# Patient Record
Sex: FEMALE | Race: WHITE | NOT HISPANIC OR LATINO | Employment: UNEMPLOYED | URBAN - METROPOLITAN AREA
[De-identification: names, ages, dates, MRNs, and addresses within clinical notes are randomized per-mention and may not be internally consistent; named-entity substitution may affect disease eponyms.]

---

## 2024-01-16 ENCOUNTER — OFFICE VISIT (OUTPATIENT)
Dept: PULMONOLOGY | Facility: HOSPITAL | Age: 59
End: 2024-01-16
Payer: MEDICAID

## 2024-01-16 VITALS
HEART RATE: 74 BPM | OXYGEN SATURATION: 88 % | DIASTOLIC BLOOD PRESSURE: 72 MMHG | RESPIRATION RATE: 14 BRPM | BODY MASS INDEX: 28.07 KG/M2 | WEIGHT: 143 LBS | HEIGHT: 60 IN | SYSTOLIC BLOOD PRESSURE: 139 MMHG

## 2024-01-16 DIAGNOSIS — G47.33 OSA (OBSTRUCTIVE SLEEP APNEA): ICD-10-CM

## 2024-01-16 DIAGNOSIS — J44.9 CHRONIC OBSTRUCTIVE PULMONARY DISEASE, UNSPECIFIED COPD TYPE: Primary | ICD-10-CM

## 2024-01-16 PROCEDURE — G0463 HOSPITAL OUTPT CLINIC VISIT: HCPCS

## 2024-01-16 RX ORDER — ALBUTEROL SULFATE 90 UG/1
2 AEROSOL, METERED RESPIRATORY (INHALATION)
COMMUNITY
Start: 2023-12-27

## 2024-01-16 RX ORDER — FLUTICASONE PROPIONATE AND SALMETEROL 50; 250 UG/1; UG/1
2 POWDER RESPIRATORY (INHALATION)
COMMUNITY
Start: 2023-12-12

## 2024-01-16 RX ORDER — GABAPENTIN 300 MG/1
1 CAPSULE ORAL 3 TIMES DAILY
COMMUNITY
Start: 2023-12-27

## 2024-01-16 NOTE — PROGRESS NOTES
PULMONARY/ CRITICAL CARE/ SLEEP MEDICINE OUTPATIENT CONSULT/ FOLLOW UP NOTE        Patient Name:  Kathy Schmidt    :  1965    Medical Record:  [id number]    PRIMARY CARE PHYSICIAN     Deana Salgado APRN    REASON FOR CONSULTATION    Kathy Schmidt is a 58 y.o. female who is referred for consultation for COPD  REVIEW OF SYSTEMS    Constitutional:  Denies fever or chills   Eyes:  Denies change in visual acuity   HENT:  Denies nasal congestion or sore throat   Respiratory:  Denies cough or shortness of breath   Cardiovascular:  Denies chest pain or edema   GI:  Denies abdominal pain, nausea, vomiting, bloody stools or diarrhea   :  Denies dysuria   Musculoskeletal:  Denies back pain or joint pain   Integument:  Denies rash   Neurologic:  Denies headache, focal weakness or sensory changes   Endocrine:  Denies polyuria or polydipsia   Lymphatic:  Denies swollen glands   Psychiatric:  Denies depression or anxiety     MEDICAL HISTORY    No past medical history on file.     SURGICAL HISTORY    No past surgical history on file.     FAMILY HISTORY    No family history on file.    SOCIAL HISTORY    Social History     Tobacco Use    Smoking status: Not on file    Smokeless tobacco: Not on file   Substance Use Topics    Alcohol use: Not on file        ALLERGIES    Not on File      MEDICATIONS    No current outpatient medications on file prior to visit.     No current facility-administered medications on file prior to visit.       PHYSICAL EXAM    There were no vitals filed for this visit.     Constitutional:  Well developed, well nourished, no acute distress, non-toxic appearance   Eyes:  PERRL, conjunctiva normal   HENT:  Atraumatic, external ears normal, nose normal, oropharynx moist, no pharyngeal exudates. mallampatti   Neck- normal range of motion, no tenderness, supple   Respiratory:  No respiratory distress, normal breath sounds, no rales, no wheezing   Cardiovascular:  Normal rate, normal rhythm, no murmurs,  no gallops, no rubs   GI:  Soft, nondistended, normal bowel sounds, nontender, no organomegaly, no mass, no rebound, no guarding   :  No costovertebral angle tenderness   Musculoskeletal:  No edema, no tenderness, no deformities. Back- no tenderness  Integument:  Well hydrated, no rash   Lymphatic:  No lymphadenopathy noted   Neurologic:  Alert & oriented x 3, CN 2-12 normal, normal motor function, normal sensory function, no focal deficits noted   Psychiatric:  Speech and behavior appropriate     No radiology results for the last 90 days.       ASSESSMENT & PLAN:      58-year-old female quit smoking July 2023, history of 40-pack-year smoking  Complaining of excessive daytime sleepiness fatigue difficulty concentrating during daytime loud snoring and witnessed apneas  No PFTs  No CT scans  On exam diminished breath sounds bilaterally.    Assessment     possible COPD  Possible obstructive sleep apnea    Plan  Low-dose CT chest no contrast lung cancer screening  PFTs  Home sleep study  Continue Advair and albuterol    This document has been electronically signed by  Tamiko Milan MD  09:34 EST

## 2024-01-23 ENCOUNTER — HOSPITAL ENCOUNTER (OUTPATIENT)
Dept: SLEEP MEDICINE | Facility: HOSPITAL | Age: 59
Discharge: HOME | End: 2024-01-23
Admitting: INTERNAL MEDICINE
Payer: MEDICAID

## 2024-01-23 DIAGNOSIS — G47.33 OSA (OBSTRUCTIVE SLEEP APNEA): ICD-10-CM

## 2024-01-23 PROCEDURE — 95806 SLEEP STUDY UNATT&RESP EFFT: CPT

## 2024-02-04 DIAGNOSIS — G47.33 OSA (OBSTRUCTIVE SLEEP APNEA): Primary | ICD-10-CM

## 2024-02-21 ENCOUNTER — HOSPITAL ENCOUNTER (OUTPATIENT)
Dept: RESPIRATORY THERAPY | Facility: HOSPITAL | Age: 59
Discharge: HOME | End: 2024-02-21
Payer: MEDICAID

## 2024-02-21 ENCOUNTER — APPOINTMENT (OUTPATIENT)
Dept: CT IMAGING | Facility: HOSPITAL | Age: 59
End: 2024-02-21
Payer: MEDICAID

## 2024-02-21 VITALS — HEART RATE: 79 BPM | OXYGEN SATURATION: 94 % | RESPIRATION RATE: 17 BRPM

## 2024-02-21 DIAGNOSIS — J44.9 CHRONIC OBSTRUCTIVE PULMONARY DISEASE, UNSPECIFIED COPD TYPE: ICD-10-CM

## 2024-02-21 PROCEDURE — 94726 PLETHYSMOGRAPHY LUNG VOLUMES: CPT

## 2024-02-21 PROCEDURE — 94060 EVALUATION OF WHEEZING: CPT

## 2024-02-21 PROCEDURE — 94664 DEMO&/EVAL PT USE INHALER: CPT

## 2024-02-21 PROCEDURE — 94799 UNLISTED PULMONARY SVC/PX: CPT

## 2024-02-21 PROCEDURE — 94729 DIFFUSING CAPACITY: CPT

## 2024-02-21 RX ORDER — ALBUTEROL SULFATE 90 UG/1
2 AEROSOL, METERED RESPIRATORY (INHALATION) ONCE
Status: COMPLETED | OUTPATIENT
Start: 2024-02-21 | End: 2024-02-21

## 2024-02-21 RX ADMIN — ALBUTEROL SULFATE 2 PUFF: 108 AEROSOL, METERED RESPIRATORY (INHALATION) at 09:09

## 2024-04-11 ENCOUNTER — OFFICE VISIT (OUTPATIENT)
Dept: PULMONOLOGY | Facility: HOSPITAL | Age: 59
End: 2024-04-11
Payer: MEDICAID

## 2024-04-11 VITALS
DIASTOLIC BLOOD PRESSURE: 73 MMHG | OXYGEN SATURATION: 90 % | HEIGHT: 60 IN | RESPIRATION RATE: 14 BRPM | BODY MASS INDEX: 28.27 KG/M2 | HEART RATE: 79 BPM | WEIGHT: 144 LBS | SYSTOLIC BLOOD PRESSURE: 128 MMHG

## 2024-04-11 DIAGNOSIS — J43.9 PULMONARY EMPHYSEMA, UNSPECIFIED EMPHYSEMA TYPE: Primary | ICD-10-CM

## 2024-04-11 PROCEDURE — G0463 HOSPITAL OUTPT CLINIC VISIT: HCPCS

## 2024-04-11 NOTE — PROGRESS NOTES
PULMONARY/ CRITICAL CARE/ SLEEP MEDICINE OUTPATIENT CONSULT/ FOLLOW UP NOTE        Patient Name:  Kathy Schmidt    :  1965    Medical Record:  [id number]    PRIMARY CARE PHYSICIAN     Deana Salgado APRN    REASON FOR CONSULTATION    Kathy Schmidt is a 58 y.o. female who is referred for consultation for MARGARET  REVIEW OF SYSTEMS    Constitutional:  Denies fever or chills   Eyes:  Denies change in visual acuity   HENT:  Denies nasal congestion or sore throat   Respiratory:  Denies cough or shortness of breath   Cardiovascular:  Denies chest pain or edema   GI:  Denies abdominal pain, nausea, vomiting, bloody stools or diarrhea   :  Denies dysuria   Musculoskeletal:  Denies back pain or joint pain   Integument:  Denies rash   Neurologic:  Denies headache, focal weakness or sensory changes   Endocrine:  Denies polyuria or polydipsia   Lymphatic:  Denies swollen glands   Psychiatric:  Denies depression or anxiety     MEDICAL HISTORY    Past Medical History:   Diagnosis Date    COPD (chronic obstructive pulmonary disease)     Dermatophytosis of nail     Encounter for long-term (current) use of other medications     Lumbar radiculopathy     Sleep apnea, obstructive 2024    CPAP Therapy        SURGICAL HISTORY    Past Surgical History:   Procedure Laterality Date    HYSTERECTOMY          FAMILY HISTORY    Family History   Problem Relation Age of Onset    Thyroid disease Mother     Emphysema Father     Heart disease Father        SOCIAL HISTORY    Social History     Tobacco Use    Smoking status: Former     Current packs/day: 0.00     Types: Cigarettes     Quit date:      Years since quittin.2     Passive exposure: Current    Smokeless tobacco: Never   Substance Use Topics    Alcohol use: Never        ALLERGIES    No Known Allergies      MEDICATIONS    Current Outpatient Medications on File Prior to Visit   Medication Sig Dispense Refill    Advair Diskus 250-50 MCG/ACT DISKUS Inhale 2 puffs 2 (Two)  "Times a Day.      albuterol sulfate  (90 Base) MCG/ACT inhaler Inhale 2 puffs 4 (Four) Times a Day.      gabapentin (NEURONTIN) 300 MG capsule Take 1 capsule by mouth 3 times a day.      METHADONE HCL PO Place 85 mg on the tongue Every Morning.       No current facility-administered medications on file prior to visit.       PHYSICAL EXAM    Vitals:    04/11/24 0914   BP: 128/73   BP Location: Left arm   Patient Position: Sitting   Cuff Size: Adult   Pulse: 79   Resp: 14   SpO2: 90%   Weight: 65.3 kg (144 lb)   Height: 152.4 cm (60\")        Constitutional:  Well developed, well nourished, no acute distress, non-toxic appearance   Eyes:  PERRL, conjunctiva normal   HENT:  Atraumatic, external ears normal, nose normal, oropharynx moist, no pharyngeal exudates. mallampatti   Neck- normal range of motion, no tenderness, supple   Respiratory:  No respiratory distress, normal breath sounds, no rales, no wheezing   Cardiovascular:  Normal rate, normal rhythm, no murmurs, no gallops, no rubs   GI:  Soft, nondistended, normal bowel sounds, nontender, no organomegaly, no mass, no rebound, no guarding   :  No costovertebral angle tenderness   Musculoskeletal:  No edema, no tenderness, no deformities. Back- no tenderness  Integument:  Well hydrated, no rash   Lymphatic:  No lymphadenopathy noted   Neurologic:  Alert & oriented x 3, CN 2-12 normal, normal motor function, normal sensory function, no focal deficits noted   Psychiatric:  Speech and behavior appropriate     No radiology results for the last 90 days.       ASSESSMENT & PLAN:          58-year-old female quit smoking July 2023, history of 40-pack-year smoking  Complaining of excessive daytime sleepiness fatigue difficulty concentrating during daytime loud snoring and witnessed apneas    Pulmonary function test 2/21/2024.  FVC 1.3 L which is 46%, improved to 57% postbronchodilator which is 22% change  FEV1 0.94 L which is 41%, improved to 51% postbronchodilator " which is 23% change  FEV1/FVC ratio 69.  Residual volume 197%  Total lung capacity 117%  Diffusion capacity 80%.      On exam diminished breath sounds bilaterally.     Assessment      severe COPD  severe obstructive sleep apnea baseline AHI 39     Plan    Low-dose CT chest no contrast lung cancer screening    Continue Advair and albuterol    Smoking cessation  Continue auto CPAP 8-20 with EPR 3       This document has been electronically signed by  Tamiko Milan MD  09:25 EDT

## 2024-07-25 ENCOUNTER — OFFICE VISIT (OUTPATIENT)
Dept: PULMONOLOGY | Facility: HOSPITAL | Age: 59
End: 2024-07-25
Payer: MEDICAID

## 2024-07-25 ENCOUNTER — PHARMACY IMMUNIZATION REVIEW (OUTPATIENT)
Dept: PHARMACY | Facility: HOSPITAL | Age: 59
End: 2024-07-25
Payer: MEDICAID

## 2024-07-25 VITALS
HEIGHT: 60 IN | BODY MASS INDEX: 25.52 KG/M2 | WEIGHT: 130 LBS | DIASTOLIC BLOOD PRESSURE: 85 MMHG | RESPIRATION RATE: 16 BRPM | OXYGEN SATURATION: 88 % | SYSTOLIC BLOOD PRESSURE: 174 MMHG | HEART RATE: 94 BPM

## 2024-07-25 DIAGNOSIS — J44.9 CHRONIC OBSTRUCTIVE PULMONARY DISEASE, UNSPECIFIED COPD TYPE: Primary | ICD-10-CM

## 2024-07-25 PROCEDURE — G0463 HOSPITAL OUTPT CLINIC VISIT: HCPCS

## 2024-07-25 RX ORDER — FLUTICASONE PROPIONATE AND SALMETEROL 50; 250 UG/1; UG/1
2 POWDER RESPIRATORY (INHALATION)
Qty: 3 EACH | Refills: 11 | Status: SHIPPED | OUTPATIENT
Start: 2024-07-25 | End: 2024-08-24

## 2024-07-25 NOTE — PROGRESS NOTES
I have reviewed the notes, assessments, and/or procedures performed by Monique Hayes, pharmacy technician, I concur with her documentation of Kathy Schmidt.

## 2024-07-25 NOTE — PROGRESS NOTES
PULMONARY/ CRITICAL CARE/ SLEEP MEDICINE OUTPATIENT CONSULT/ FOLLOW UP NOTE        Patient Name:  Kathy Schmidt    :  1965    Medical Record:  [id number]    PRIMARY CARE PHYSICIAN     eDana Salgado APRN    REASON FOR CONSULTATION    Kathy Schmidt is a 59 y.o. female who is referred for consultation for COPD  REVIEW OF SYSTEMS    Constitutional:  Denies fever or chills   Eyes:  Denies change in visual acuity   HENT:  Denies nasal congestion or sore throat   Respiratory:  Denies cough or shortness of breath   Cardiovascular:  Denies chest pain or edema   GI:  Denies abdominal pain, nausea, vomiting, bloody stools or diarrhea   :  Denies dysuria   Musculoskeletal:  Denies back pain or joint pain   Integument:  Denies rash   Neurologic:  Denies headache, focal weakness or sensory changes   Endocrine:  Denies polyuria or polydipsia   Lymphatic:  Denies swollen glands   Psychiatric:  Denies depression or anxiety     MEDICAL HISTORY    Past Medical History:   Diagnosis Date    COPD (chronic obstructive pulmonary disease)     Dermatophytosis of nail     Encounter for long-term (current) use of other medications     Lumbar radiculopathy     Sleep apnea, obstructive 2024    CPAP Therapy        SURGICAL HISTORY    Past Surgical History:   Procedure Laterality Date    HYSTERECTOMY          FAMILY HISTORY    Family History   Problem Relation Age of Onset    Thyroid disease Mother     Emphysema Father     Heart disease Father        SOCIAL HISTORY    Social History     Tobacco Use    Smoking status: Former     Current packs/day: 0.00     Types: Cigarettes     Quit date:      Years since quittin.5     Passive exposure: Current    Smokeless tobacco: Never   Substance Use Topics    Alcohol use: Never        ALLERGIES    No Known Allergies      MEDICATIONS    Current Outpatient Medications on File Prior to Visit   Medication Sig Dispense Refill    Advair Diskus 250-50 MCG/ACT DISKUS Inhale 2 puffs 2 (Two)  "Times a Day.      albuterol sulfate  (90 Base) MCG/ACT inhaler Inhale 2 puffs 4 (Four) Times a Day.      gabapentin (NEURONTIN) 300 MG capsule Take 1 capsule by mouth 3 times a day.      METHADONE HCL PO Place 85 mg on the tongue Every Morning.       No current facility-administered medications on file prior to visit.       PHYSICAL EXAM    Vitals:    07/25/24 0943   BP: 174/85   BP Location: Left arm   Patient Position: Sitting   Cuff Size: Adult   Pulse: 94   Resp: 16   SpO2: (!) 88%   Weight: 59 kg (130 lb)   Height: 152.4 cm (60\")        Constitutional:  Well developed, well nourished, no acute distress, non-toxic appearance   Eyes:  PERRL, conjunctiva normal   HENT:  Atraumatic, external ears normal, nose normal, oropharynx moist, no pharyngeal exudates. mallampatti   Neck- normal range of motion, no tenderness, supple   Respiratory:  No respiratory distress, normal breath sounds, no rales, no wheezing   Cardiovascular:  Normal rate, normal rhythm, no murmurs, no gallops, no rubs   GI:  Soft, nondistended, normal bowel sounds, nontender, no organomegaly, no mass, no rebound, no guarding   :  No costovertebral angle tenderness   Musculoskeletal:  No edema, no tenderness, no deformities. Back- no tenderness  Integument:  Well hydrated, no rash   Lymphatic:  No lymphadenopathy noted   Neurologic:  Alert & oriented x 3, CN 2-12 normal, normal motor function, normal sensory function, no focal deficits noted   Psychiatric:  Speech and behavior appropriate     No radiology results for the last 90 days.       ASSESSMENT & PLAN:           58-year-old female quit smoking July 2023, history of 40-pack-year smoking  Complaining of excessive daytime sleepiness fatigue difficulty concentrating during daytime loud snoring and witnessed apneas     Pulmonary function test 2/21/2024.  FVC 1.3 L which is 46%, improved to 57% postbronchodilator which is 22% change  FEV1 0.94 L which is 41%, improved to 51% " postbronchodilator which is 23% change  FEV1/FVC ratio 69.  Residual volume 197%  Total lung capacity 117%  Diffusion capacity 80%.        On exam diminished breath sounds bilaterally.     Assessment      severe COPD  Pulmonary function test 2/21/2024.  FVC 1.3 L which is 46%, improved to 57% postbronchodilator which is 22% change  FEV1 0.94 L which is 41%, improved to 51% postbronchodilator which is 23% change  FEV1/FVC ratio 69.  Residual volume 197%  Total lung capacity 117%   Diffusion capacity 80%.      severe obstructive sleep apnea baseline AHI 39    CT chest, 5/15/24 at priority, no nodules     Plan     Low-dose CT chest no contrast lung cancer screening, next may, 2025     Continue Advair and albuterol     Smoking cessation    Continue auto CPAP 8-20 with EPR 3  Low compliance 34%  Residual AHI 15    Order vaccines;  Prevnar and shingels       This document has been electronically signed by  Tamiko Milan MD  10:00 EDT   independent

## 2024-07-25 NOTE — PROGRESS NOTES
[facility] Vaccination Assessment    Initial vaccination needs assessment completed and recommendations discussed in-person with patient on 7/25/24.     Allergies:    Patient has no known allergies.    Vaccine History:   Immunization History   Administered Date(s) Administered    Hep A / Hep B 07/25/2024    Pneumococcal Conjugate 20-Valent (PCV20) 07/25/2024       Assessment:    Patient was screened for indications and contraindications to the following vaccinations:     Influenza: Up-to-date.   COVID-19: Patient has indication to receive, patient declined.   HPV: No indication for this vaccination.   HAV: Patient has indication to receive, will arrange for patient to receive.  HBV: Patient has indication to receive, will arrange for patient to receive.  Tdap: Up-to-date.   PCV13, PCV15, or PCV20: Patient has indication to receive, will arrange for patient to receive.  PPSV23: No indication for this vaccination.   MMR: Patient has indication to receive, patient declined.   Varicella: No indication for this vaccination.   Zoster: Patient has indication to receive, will arrange for patient to receive.  Hib: No indication for this vaccination.   Meningococcal: No indication for this vaccination.   RSV  Not in season. Patient has COPD. Will discuss this vaccination with patient at follow up for subsequent vaccinations.    Plan:    The following vaccines were administered today:  HAV/HBV and PCV20  Patient will be scheduled to receive HAV/HBV and Zoster in 1 month.     Eli Hayes, Pharmacy Technician  7/25/2024  12:02 EDT

## 2024-08-30 ENCOUNTER — PHARMACY IMMUNIZATION REVIEW (OUTPATIENT)
Dept: PHARMACY | Facility: HOSPITAL | Age: 59
End: 2024-08-30
Payer: MEDICAID

## 2024-08-30 NOTE — PROGRESS NOTES
[facility] Vaccination Administration    Patient reported to [facility] via referral on 8/30/2024    Allergies:    Patient has no known allergies.    Vaccine History:   Immunization History   Administered Date(s) Administered    Hep A / Hep B 07/25/2024    Pneumococcal Conjugate 20-Valent (PCV20) 07/25/2024       Plan:    The following vaccines were administered today:  HAV/HBV and Zoster (#2 of 3 of HAV/HBV and #1 of 2 for Zoster)  Will schedule appointment for 6 months for both HAV/HBV and Zoster    Susanna Buckner, PharmMANISHA  8/30/2024  10:31 EDT

## 2025-01-30 ENCOUNTER — APPOINTMENT (OUTPATIENT)
Dept: PHARMACY | Facility: HOSPITAL | Age: 60
End: 2025-01-30
Payer: MEDICAID

## 2025-01-30 ENCOUNTER — PHARMACY IMMUNIZATION REVIEW (OUTPATIENT)
Dept: PHARMACY | Facility: HOSPITAL | Age: 60
End: 2025-01-30
Payer: MEDICAID

## 2025-01-30 ENCOUNTER — OFFICE VISIT (OUTPATIENT)
Dept: PULMONOLOGY | Facility: HOSPITAL | Age: 60
End: 2025-01-30
Payer: MEDICAID

## 2025-01-30 VITALS
RESPIRATION RATE: 16 BRPM | HEART RATE: 77 BPM | BODY MASS INDEX: 27.88 KG/M2 | WEIGHT: 142 LBS | OXYGEN SATURATION: 85 % | DIASTOLIC BLOOD PRESSURE: 75 MMHG | HEIGHT: 60 IN | SYSTOLIC BLOOD PRESSURE: 155 MMHG

## 2025-01-30 DIAGNOSIS — J44.9 CHRONIC OBSTRUCTIVE PULMONARY DISEASE, UNSPECIFIED COPD TYPE: Primary | ICD-10-CM

## 2025-01-30 PROCEDURE — G0463 HOSPITAL OUTPT CLINIC VISIT: HCPCS

## 2025-01-30 NOTE — PROGRESS NOTES
[facility] Vaccination Administration    Patient reported to [facility] via referral on 1/30/2025    Allergies:    Patient has no known allergies.    Vaccine History:   Immunization History   Administered Date(s) Administered    Hep A / Hep B 07/25/2024, 08/30/2024, 01/30/2025    Pneumococcal Conjugate 20-Valent (PCV20) 07/25/2024    Shingrix 08/30/2024, 01/30/2025       Plan:    The following vaccines were administered today:  HAV/HBV and Zoster Twinrix #3/ Shingrix#2    Fifi Bazzi  1/30/2025  11:49 EST

## 2025-01-30 NOTE — PROGRESS NOTES
PULMONARY/ CRITICAL CARE/ SLEEP MEDICINE OUTPATIENT CONSULT/ FOLLOW UP NOTE        Patient Name:  Kathy Schmidt    :  1965    Medical Record:  [id number]    PRIMARY CARE PHYSICIAN     Deana Salgado APRN    REASON FOR CONSULTATION    Kathy Schmidt is a 59 y.o. female who is referred for consultation for COPD  REVIEW OF SYSTEMS    Constitutional:  Denies fever or chills   Eyes:  Denies change in visual acuity   HENT:  Denies nasal congestion or sore throat   Respiratory:  Denies cough or shortness of breath   Cardiovascular:  Denies chest pain or edema   GI:  Denies abdominal pain, nausea, vomiting, bloody stools or diarrhea   :  Denies dysuria   Musculoskeletal:  Denies back pain or joint pain   Integument:  Denies rash   Neurologic:  Denies headache, focal weakness or sensory changes   Endocrine:  Denies polyuria or polydipsia   Lymphatic:  Denies swollen glands   Psychiatric:  Denies depression or anxiety     MEDICAL HISTORY    Past Medical History:   Diagnosis Date    COPD (chronic obstructive pulmonary disease)     Dermatophytosis of nail     Encounter for long-term (current) use of other medications     Lumbar radiculopathy     Sleep apnea, obstructive 2024    CPAP Therapy        SURGICAL HISTORY    Past Surgical History:   Procedure Laterality Date    HYSTERECTOMY          FAMILY HISTORY    Family History   Problem Relation Age of Onset    Thyroid disease Mother     Emphysema Father     Heart disease Father        SOCIAL HISTORY    Social History     Tobacco Use    Smoking status: Former     Current packs/day: 0.00     Types: Cigarettes     Quit date:      Years since quittin.0     Passive exposure: Current    Smokeless tobacco: Never   Substance Use Topics    Alcohol use: Never        ALLERGIES    No Known Allergies      MEDICATIONS    Current Outpatient Medications on File Prior to Visit   Medication Sig Dispense Refill    Advair Diskus 250-50 MCG/ACT DISKUS Inhale 2 puffs 2 (Two)  "Times a Day for 30 days. 3 each 11    albuterol sulfate  (90 Base) MCG/ACT inhaler Inhale 2 puffs 4 (Four) Times a Day.      gabapentin (NEURONTIN) 300 MG capsule Take 1 capsule by mouth 3 times a day.      hepatitis A-hepatitis B (Twinrix) 720-20 ELU-MCG/ML injection Inject 1 mL into the appropriate muscle as directed by prescriber. 1 mL 2    METHADONE HCL PO Place 85 mg on the tongue Every Morning.      Pneumococcal 20-Cecilia Conj Vacc (Prevnar 20) 0.5 ML suspension prefilled syringe vaccine Inject 0.5 mL into the appropriate muscle as directed by prescriber. 0.5 mL 0    Zoster Vac Recomb Adjuvanted (Shingrix) 50 MCG/0.5ML reconstituted suspension Inject 0.5 mL into the appropriate muscle as directed by prescriber. 0.5 mL 1     No current facility-administered medications on file prior to visit.       PHYSICAL EXAM    Vitals:    01/30/25 1058   BP: 155/75   BP Location: Left arm   Patient Position: Sitting   Cuff Size: Adult   Pulse: 77   Resp: 16   SpO2: (!) 85%   Weight: 64.4 kg (142 lb)   Height: 152.4 cm (60\")        Constitutional:  Well developed, well nourished, no acute distress, non-toxic appearance   Eyes:  PERRL, conjunctiva normal   HENT:  Atraumatic, external ears normal, nose normal, oropharynx moist, no pharyngeal exudates. mallampatti   Neck- normal range of motion, no tenderness, supple   Respiratory:  No respiratory distress, normal breath sounds, no rales, no wheezing   Cardiovascular:  Normal rate, normal rhythm, no murmurs, no gallops, no rubs   GI:  Soft, nondistended, normal bowel sounds, nontender, no organomegaly, no mass, no rebound, no guarding   :  No costovertebral angle tenderness   Musculoskeletal:  No edema, no tenderness, no deformities. Back- no tenderness  Integument:  Well hydrated, no rash   Lymphatic:  No lymphadenopathy noted   Neurologic:  Alert & oriented x 3, CN 2-12 normal, normal motor function, normal sensory function, no focal deficits noted   Psychiatric:  " Speech and behavior appropriate     No radiology results for the last 90 days.       ASSESSMENT & PLAN:      severe COPD  Pulmonary function test 2/21/2024.  FVC 1.3 L which is 46%, improved to 57% postbronchodilator which is 22% change  FEV1 0.94 L which is 41%, improved to 51% postbronchodilator which is 23% change  FEV1/FVC ratio 69.  Residual volume 197%  Total lung capacity 117%   Diffusion capacity 80%.        severe obstructive sleep apnea baseline AHI 39     CT chest, 5/15/24 at priority, no nodules     Plan     Low-dose CT chest no contrast lung cancer screening, next May, 2025     Continue Advair and albuterol     Smoking cessation     encourage auto CPAP 8-20 with EPR 3  Low compliance 34%  Residual AHI 15     S/p vaccines;  Prevnar and shingels       This document has been electronically signed by  Tamiko Milan MD  11:12 EST

## 2025-05-01 DIAGNOSIS — Z87.891 FORMER SMOKER: ICD-10-CM

## 2025-05-01 DIAGNOSIS — Z12.2 ENCOUNTER FOR SCREENING FOR LUNG CANCER: Primary | ICD-10-CM

## 2025-05-06 ENCOUNTER — HOSPITAL ENCOUNTER (OUTPATIENT)
Dept: CT IMAGING | Facility: HOSPITAL | Age: 60
Discharge: HOME | End: 2025-05-06
Admitting: INTERNAL MEDICINE
Payer: MEDICAID

## 2025-05-06 DIAGNOSIS — Z87.891 FORMER SMOKER: ICD-10-CM

## 2025-05-06 DIAGNOSIS — Z12.2 ENCOUNTER FOR SCREENING FOR LUNG CANCER: ICD-10-CM

## 2025-05-06 PROCEDURE — 71271 CT THORAX LUNG CANCER SCR C-: CPT

## 2025-08-07 ENCOUNTER — OFFICE VISIT (OUTPATIENT)
Dept: PULMONOLOGY | Facility: HOSPITAL | Age: 60
End: 2025-08-07
Payer: COMMERCIAL

## 2025-08-07 VITALS
OXYGEN SATURATION: 91 % | WEIGHT: 142 LBS | BODY MASS INDEX: 27.88 KG/M2 | HEIGHT: 60 IN | RESPIRATION RATE: 17 BRPM | DIASTOLIC BLOOD PRESSURE: 81 MMHG | SYSTOLIC BLOOD PRESSURE: 159 MMHG | HEART RATE: 72 BPM

## 2025-08-07 DIAGNOSIS — J43.9 PULMONARY EMPHYSEMA, UNSPECIFIED EMPHYSEMA TYPE: ICD-10-CM

## 2025-08-07 DIAGNOSIS — G47.33 OSA (OBSTRUCTIVE SLEEP APNEA): Primary | ICD-10-CM

## 2025-08-07 PROCEDURE — G0463 HOSPITAL OUTPT CLINIC VISIT: HCPCS

## 2025-08-07 RX ORDER — CHOLECALCIFEROL (VITAMIN D3) 1250 MCG
1 CAPSULE ORAL WEEKLY
COMMUNITY
Start: 2025-07-29

## 2025-08-07 RX ORDER — ALENDRONATE SODIUM 70 MG/1
TABLET ORAL
COMMUNITY
Start: 2025-05-17

## 2025-08-07 RX ORDER — GABAPENTIN 400 MG/1
400 CAPSULE ORAL 3 TIMES DAILY
COMMUNITY
Start: 2025-08-06

## 2025-08-07 RX ORDER — ATORVASTATIN CALCIUM 20 MG/1
1 TABLET, FILM COATED ORAL DAILY
COMMUNITY
Start: 2025-07-31

## 2025-08-07 RX ORDER — OMEPRAZOLE 40 MG/1
40 CAPSULE, DELAYED RELEASE ORAL DAILY
COMMUNITY
Start: 2025-07-31